# Patient Record
(demographics unavailable — no encounter records)

---

## 2024-10-07 NOTE — ADDENDUM
[FreeTextEntry1] : I, ANGELLA KONG, acted solely as a scribe for Dr. Luke Helton on this date 10/07/2024.  All medical record entries made by the Scribe were at my, Dr. Luke Helton, direction and personally dictated by me on 10/07/2024. I have reviewed the chart and agree that the record accurately reflects my personal performance of the history, physical exam, assessment and plan. I have also personally directed, reviewed, and agreed with the chart.

## 2024-10-07 NOTE — DISCUSSION/SUMMARY
[de-identified] : I went over the pathophysiology of the patient's symptoms in great detail with the patient. The patient elected to receive a Monovisc injection into her right knee today, and tolerated it well. I instructed the patient on ROM exercises, and told them to take it easy. The use of ice and rest was reviewed with the patient. The patient may resume activities tomorrow. I reminded the patient that it takes 4 to 6 weeks after the final injection to feel symptom relief.  Weight loss was discussed All of their questions were answered. They understand and consent to the plan.   FU in 6 weeks. She will get bilateral knee x rays upon return.

## 2024-10-07 NOTE — PHYSICAL EXAM
[Normal] : Gait: normal [Rad] : radial 2+ and symmetric bilaterally [de-identified] : Constitutional o Appearance : well-nourished, well developed, alert, in no acute distress  Head and Face o Head :  Inspection : atraumatic, normocephalic o Face :  Inspection : no visible rash or discoloration Respiratory o Respiratory Effort: breathing unlabored  Neurologic o Sensation : Normal sensation  Psychiatric o Mood and Affect: mood normal, affect appropriate  Lymphatic o Additional Nodes : No palpable lymph nodes present   o Elbow :  Inspection/Palpation : no tenderness, no swelling or deformities  Range of Motion : full and painless in all planes, no crepitance  Strength : flexion and extension 5/5  Stability : no joint instability on provocative testing  o Sensation : sensation intact to light touch o Tests: Tinels Sign negative, no pain with resisted pronation or supination, no pain with resisted extension of the middle finger, no pain with resisted extension of the wrist, no pain with gripping  Lumbosacral Spine o Inspection : no visible rash or discoloration o Palpation : paraspinal musculature diffusely tender, no sciatic notch tenderness  o Range of Motion : FROM, extension, sidebending and rotation cause no discomfort  o Muscle Strength : paraspinal muscle strength and tone within normal limits o Muscle Tone : paraspinal muscle strength and tone within normal limits o Tests: straight leg test negative bilaterally  Right Lower Extremity o Buttock : no tenderness, swelling or deformities  o Right Hip :    - Inspection/Palpation : mild greater trochanteric and ITB tenderness, no swelling or deformities    - Range of Motion : full flexion, and pain with IR rotation that is limited no crepitance    - Stability : joint stability intact    - Strength : extension, flexion 3/5, adduction, abduction, internal rotation and external rotation    - Tests: Felipe's test negative  o Right Knee :  Inspection/Palpation :  posteromedial joint line tenderness, no swelling  Range of Motion : 0-115, slightly decreased patellofemoral glide, mild patellofemoral crepitance  Stability : no valgus or varus instability present on provocative testing  Strength : flexion and extension 3/5  Tests and Signs : negative Anterior Drawer, negative Lachman, negative Evelyne  Left Lower Extremity o Buttock : no tenderness, swelling or deformities  o Left Hip :    - Inspection/Palpation : mild greater trochanteric and ITB tenderness, no swelling or deformities    - Range of Motion : full flex and limtied IR, no crepitance    - Stability : joint stability intact    - Strength : extension, flexion 4+/5, adduction, abduction, internal rotation and external rotation    - Tests: Felipe's test negative  o Left Knee :  Inspection/Palpation : medial compartment tenderness no lateral compartment tenderness , no swelling  Range of Motion : 0-115 , minimal patellofemoral crepitance, decreased patellofemoral glide   Stability : no valgus or varus instability present on provocative testing  Strength : flexion and extension 4+/5  Tests and Signs : negative Anterior Drawer, negative Lachman, negative Evelyne  Gait and Station: Gait: ambulating with a cane, slightly kyphotic, no significant extremity swelling or lymphedema, poor core strength, very tight hamstrings bilaterally, no foot drop, normal sensation to light touch   o Knee injection : Indication- right knee osteoarthritis, Anatomic location- right intra-articular joint space, Spray - area was sterilized with Betadine and alcohol and anesthetized with Ethyl Chloride , needle used-20G, Medications given- 4cc's Monovisc under Ultrasound guidance. The patient tolerated the procedure well.  [de-identified] : AP, transscapular and axillary x-rays of the left shoulder demonstrate no fracture, no dislocation and no bony abnormality.

## 2024-10-07 NOTE — REASON FOR VISIT
[Follow-Up Visit] : a follow-up visit for [Knee Pain] : knee pain [FreeTextEntry2] : left shoulder pain

## 2024-10-07 NOTE — HISTORY OF PRESENT ILLNESS
[de-identified] : 79 year old female presents for a right knee Monovisc injection today 10/7/2024. She had a left knee Monovisc injection on 10/1/2024 and denies problems. Patient presents ambulating with a cane. She notes she is doing well form the gel injections. She denies any swelling or buckling. Of note, She saw Dr. Casillas regarding her spinal stenosis and will consider surgery when she is miserable. She notes her vertigo has been under control.   Radiology Results done on 5/10/2024 o Lumbosacral Spine : AP and lateral views were obtained and revealed grade 1 spondylolisthesis of L4-5 and L5-S1 with diffuse facet arthropathy and foraminal stenosis at L4-5 and L5-S1 o Pelvis : AP pelvis was obtained and revealed moderate arthritis of both hips   Radiology Results 09/28/2023 o Right Knee : Standing AP, lateral and tunnel views of the knee were obtained and revealed severe medial patellofemoral arthritis  o Left Knee : Standing AP, lateral and tunnel views of the knee were obtained and revealed severe medial patellofemoral arthritis   Radiology Results from 03/16/2023: o Right Knee : Standing AP, lateral, tunnel, and skyline views of the knee were obtained and revealed severe medial compartment narrowing with moderate patellofemoral arthritis.  o Left Knee : Standing AP, lateral, tunnel, and skyline views of the knee were obtained and revealed moderate medial and patellofemoral arthritis.

## 2024-10-20 NOTE — PROCEDURE
[FreeTextEntry1] : Pulmonary Function Test performed in my office today: Spirometry: Within normal limits; Lung Volume: Within normal limits; Diffusion: Moderate impairment. -------------  Exhaled Nitric Oxide             Final  No Documents Attached    	Test  	Result  	Flag	Reference	Goal	Last Verified  	Exhaled Nitric Oxide	6	 	 		REQUIRED  Ordered by: ENRICO DENNEY       Collected/Examined: [id number] 11:06AM       Verification Required       Stage: Final       Performed at: In Office       Performed by: ENRICO DENNEY       Resulted: [id number] 11:06AM       Last Updated: [id number] 11:06AM

## 2024-10-20 NOTE — ASSESSMENT
[FreeTextEntry1] : Patient is currently on treatment with PAP. Data download confirms excellent compliance. Patient continues to use treatment and is benefiting from it.  Return for sleep follow-up in 3 months.

## 2024-10-20 NOTE — HISTORY OF PRESENT ILLNESS
[TextBox_4] : SHELIA f/u  Reports no current sleep symptoms. Using PAP on a nightly basis without difficulty. Reports no symptoms of daytime sleepiness. Getting supplies regularly.   Device: AirSense 11   Vendor: [employer]   Settin-15  Mask: nasal mask (airfit [id number])  Issues: none

## 2024-10-20 NOTE — REASON FOR VISIT
[Follow-Up] : a follow-up visit [Sleep Apnea] : sleep apnea [Hypersomnolence] : hypersomnolence [Cough] : cough

## 2024-10-20 NOTE — PROCEDURE
[FreeTextEntry1] : Pulmonary Function Test performed in my office today: Spirometry: Within normal limits; Lung Volume: Within normal limits; Diffusion: Moderate impairment. -------------  Exhaled Nitric Oxide             Final  No Documents Attached    	Test  	Result  	Flag	Reference	Goal	Last Verified  	Exhaled Nitric Oxide	6	 	 		REQUIRED  Ordered by: ENRICO DENNEY       Collected/Examined: [id number] 11:06AM       Verification Required       Stage: Final       Performed at: In Office       Performed by: ENRICO DENNEY       Resulted: [id number] 11:06AM       Last Updated: [id number] 11:06AM

## 2024-11-03 NOTE — HEALTH RISK ASSESSMENT
[0] : 2) Feeling down, depressed, or hopeless: Not at all (0) [PHQ-2 Negative - No further assessment needed] : PHQ-2 Negative - No further assessment needed [Former] : Former [[id number]] : 0

## 2024-11-03 NOTE — PHYSICAL EXAM
[No Acute Distress] : no acute distress [Well Nourished] : well nourished [Well Developed] : well developed [Well-Appearing] : well-appearing [Normal Sclera/Conjunctiva] : normal sclera/conjunctiva [PERRL] : pupils equal round and reactive to light [EOMI] : extraocular movements intact [Normal Outer Ear/Nose] : the outer ears and nose were normal in appearance [Normal Oropharynx] : the oropharynx was normal [No JVD] : no jugular venous distention [No Lymphadenopathy] : no lymphadenopathy [Supple] : supple [Thyroid Normal, No Nodules] : the thyroid was normal and there were no nodules present [No Respiratory Distress] : no respiratory distress  [No Accessory Muscle Use] : no accessory muscle use [Clear to Auscultation] : lungs were clear to auscultation bilaterally [Normal Rate] : normal rate  [Regular Rhythm] : with a regular rhythm [Normal S1, S2] : normal S1 and S2 [No Murmur] : no murmur heard [No Carotid Bruits] : no carotid bruits [No Varicosities] : no varicosities [Pedal Pulses Present] : the pedal pulses are present [No Edema] : there was no peripheral edema [No Extremity Clubbing/Cyanosis] : no extremity clubbing/cyanosis [Soft] : abdomen soft [Non Tender] : non-tender [Non-distended] : non-distended [Normal Bowel Sounds] : normal bowel sounds [Normal Posterior Cervical Nodes] : no posterior cervical lymphadenopathy [Normal Anterior Cervical Nodes] : no anterior cervical lymphadenopathy [No CVA Tenderness] : no CVA  tenderness [No Spinal Tenderness] : no spinal tenderness [No Joint Swelling] : no joint swelling [Grossly Normal Strength/Tone] : grossly normal strength/tone [No Rash] : no rash [Coordination Grossly Intact] : coordination grossly intact [No Focal Deficits] : no focal deficits [Normal Gait] : normal gait [Normal Affect] : the affect was normal [Alert and Oriented x3] : oriented to person, place, and time [Normal Insight/Judgement] : insight and judgment were intact [de-identified] : lateral to R breast near rib area: with small circular area of erythema unclear bug bite no cellulitis, no hot, warm or tender

## 2024-11-03 NOTE — HEALTH RISK ASSESSMENT
[0] : 2) Feeling down, depressed, or hopeless: Not at all (0) [PHQ-2 Negative - No further assessment needed] : PHQ-2 Negative - No further assessment needed [Former] : Former [[id number]] : 0

## 2024-11-03 NOTE — ADDENDUM
[FreeTextEntry1] : This note was written by Andree Gray on 10/29/2024 acting as medical scribe for Dr. Guy Bean. I, Dr. Guy Bean, have read and attest that all the information, medical decision making and discharge instructions within are true and accurate.

## 2024-11-03 NOTE — HISTORY OF PRESENT ILLNESS
[FreeTextEntry1] : 5-month f/u  rash/ lower back pain  [de-identified] : KEV BOYER is an 80-year old female with PMH kidney stones s/p hx of stent now removed, spinal stenosis, thyroid cancer s/p total thyroidectomy s/p radioactive iodine, presented to the office today for 5-month f/u. Pt reports this weekend she has lower back pain after moving heavy furniture says she takes Advil which helps a little but still is bad.  She has concert tickets for tomorrow night at [employer] and is requesting for a written note that she cannot go given pain and need to be mobile. Denies LE weakness, urinary/bowel incontinence, saddle anesthesia. She says she has a rash on her R breast, noticed it for a couple days now and says it can be itchy sometimes. Denies any tenderness or pain around the nipple.  Denies chest pain, SOB, AKERS, dizziness, diaphoresis, palpitations, LE swelling, orthopnea, syncope, n/v, headache.

## 2024-11-03 NOTE — HEALTH RISK ASSESSMENT
[0] : 2) Feeling down, depressed, or hopeless: Not at all (0) [PHQ-2 Negative - No further assessment needed] : PHQ-2 Negative - No further assessment needed [Former] : Former [[id number]] : 0

## 2024-11-03 NOTE — HISTORY OF PRESENT ILLNESS
[FreeTextEntry1] : 5-month f/u  rash/ lower back pain  [de-identified] : KEV BOYER is an 80-year old female with PMH kidney stones s/p hx of stent now removed, spinal stenosis, thyroid cancer s/p total thyroidectomy s/p radioactive iodine, presented to the office today for 5-month f/u. Pt reports this weekend she has lower back pain after moving heavy furniture says she takes Advil which helps a little but still is bad.  She has concert tickets for tomorrow night at [employer] and is requesting for a written note that she cannot go given pain and need to be mobile. Denies LE weakness, urinary/bowel incontinence, saddle anesthesia. She says she has a rash on her R breast, noticed it for a couple days now and says it can be itchy sometimes. Denies any tenderness or pain around the nipple.  Denies chest pain, SOB, AKERS, dizziness, diaphoresis, palpitations, LE swelling, orthopnea, syncope, n/v, headache.

## 2024-11-03 NOTE — PHYSICAL EXAM
[No Acute Distress] : no acute distress [Well Nourished] : well nourished [Well Developed] : well developed [Well-Appearing] : well-appearing [Normal Sclera/Conjunctiva] : normal sclera/conjunctiva [PERRL] : pupils equal round and reactive to light [EOMI] : extraocular movements intact [Normal Outer Ear/Nose] : the outer ears and nose were normal in appearance [Normal Oropharynx] : the oropharynx was normal [No JVD] : no jugular venous distention [No Lymphadenopathy] : no lymphadenopathy [Supple] : supple [Thyroid Normal, No Nodules] : the thyroid was normal and there were no nodules present [No Respiratory Distress] : no respiratory distress  [No Accessory Muscle Use] : no accessory muscle use [Clear to Auscultation] : lungs were clear to auscultation bilaterally [Normal Rate] : normal rate  [Regular Rhythm] : with a regular rhythm [Normal S1, S2] : normal S1 and S2 [No Murmur] : no murmur heard [No Carotid Bruits] : no carotid bruits [No Varicosities] : no varicosities [Pedal Pulses Present] : the pedal pulses are present [No Edema] : there was no peripheral edema [No Extremity Clubbing/Cyanosis] : no extremity clubbing/cyanosis [Soft] : abdomen soft [Non Tender] : non-tender [Non-distended] : non-distended [Normal Bowel Sounds] : normal bowel sounds [Normal Posterior Cervical Nodes] : no posterior cervical lymphadenopathy [Normal Anterior Cervical Nodes] : no anterior cervical lymphadenopathy [No CVA Tenderness] : no CVA  tenderness [No Spinal Tenderness] : no spinal tenderness [No Joint Swelling] : no joint swelling [Grossly Normal Strength/Tone] : grossly normal strength/tone [No Rash] : no rash [Coordination Grossly Intact] : coordination grossly intact [No Focal Deficits] : no focal deficits [Normal Gait] : normal gait [Normal Affect] : the affect was normal [Alert and Oriented x3] : oriented to person, place, and time [Normal Insight/Judgement] : insight and judgment were intact [de-identified] : lateral to R breast near rib area: with small circular area of erythema unclear bug bite no cellulitis, no hot, warm or tender

## 2024-11-03 NOTE — HISTORY OF PRESENT ILLNESS
[FreeTextEntry1] : 5-month f/u  rash/ lower back pain  [de-identified] : KEV BOYER is an 80-year old female with PMH kidney stones s/p hx of stent now removed, spinal stenosis, thyroid cancer s/p total thyroidectomy s/p radioactive iodine, presented to the office today for 5-month f/u. Pt reports this weekend she has lower back pain after moving heavy furniture says she takes Advil which helps a little but still is bad.  She has concert tickets for tomorrow night at [employer] and is requesting for a written note that she cannot go given pain and need to be mobile. Denies LE weakness, urinary/bowel incontinence, saddle anesthesia. She says she has a rash on her R breast, noticed it for a couple days now and says it can be itchy sometimes. Denies any tenderness or pain around the nipple.  Denies chest pain, SOB, AKERS, dizziness, diaphoresis, palpitations, LE swelling, orthopnea, syncope, n/v, headache.

## 2024-11-03 NOTE — PHYSICAL EXAM
[No Acute Distress] : no acute distress [Well Nourished] : well nourished [Well Developed] : well developed [Well-Appearing] : well-appearing [Normal Sclera/Conjunctiva] : normal sclera/conjunctiva [PERRL] : pupils equal round and reactive to light [EOMI] : extraocular movements intact [Normal Outer Ear/Nose] : the outer ears and nose were normal in appearance [Normal Oropharynx] : the oropharynx was normal [No JVD] : no jugular venous distention [No Lymphadenopathy] : no lymphadenopathy [Supple] : supple [Thyroid Normal, No Nodules] : the thyroid was normal and there were no nodules present [No Respiratory Distress] : no respiratory distress  [No Accessory Muscle Use] : no accessory muscle use [Clear to Auscultation] : lungs were clear to auscultation bilaterally [Normal Rate] : normal rate  [Regular Rhythm] : with a regular rhythm [Normal S1, S2] : normal S1 and S2 [No Murmur] : no murmur heard [No Carotid Bruits] : no carotid bruits [No Varicosities] : no varicosities [Pedal Pulses Present] : the pedal pulses are present [No Edema] : there was no peripheral edema [No Extremity Clubbing/Cyanosis] : no extremity clubbing/cyanosis [Soft] : abdomen soft [Non Tender] : non-tender [Non-distended] : non-distended [Normal Bowel Sounds] : normal bowel sounds [Normal Posterior Cervical Nodes] : no posterior cervical lymphadenopathy [Normal Anterior Cervical Nodes] : no anterior cervical lymphadenopathy [No CVA Tenderness] : no CVA  tenderness [No Spinal Tenderness] : no spinal tenderness [No Joint Swelling] : no joint swelling [Grossly Normal Strength/Tone] : grossly normal strength/tone [No Rash] : no rash [Coordination Grossly Intact] : coordination grossly intact [No Focal Deficits] : no focal deficits [Normal Gait] : normal gait [Normal Affect] : the affect was normal [Alert and Oriented x3] : oriented to person, place, and time [Normal Insight/Judgement] : insight and judgment were intact [de-identified] : lateral to R breast near rib area: with small circular area of erythema unclear bug bite no cellulitis, no hot, warm or tender

## 2024-11-03 NOTE — ADDENDUM
[FreeTextEntry1] : This note was written by Andere Gray on 10/29/2024 acting as medical scribe for Dr. Guy Bean. I, Dr. Guy Bean, have read and attest that all the information, medical decision making and discharge instructions within are true and accurate.

## 2024-11-15 NOTE — ASSESSMENT
[FreeTextEntry1] : Dr. Deshpande reviewed with KEV her CPAP compliance in office today. KEV is both compliant and benefiting from CPAP usage.   Kev should follow up with Dr. Deshpande in 1 month.

## 2024-11-15 NOTE — ADDENDUM
[FreeTextEntry1] : I, Fernando Osuna, acted solely as a scribe for Dr. Cira Deshpande D.O. on this date 11/15/2024.   All medical record entries made by the Scribe were at my, Dr. Cira Deshpande D.O., direction and personally dictated by me on 11/15/2024. I have reviewed the chart and agree that the record accurately reflects my personal performance of the history, physical exam, assessment and plan. I have also personally directed, reviewed, and agreed with the chart.

## 2024-11-18 NOTE — REVIEW OF SYSTEMS
Date & Time: 7/22/2024, 4:56 PM  Patient: Sj Simmons Sr.  Encounter Provider(s):    Chapito Reeder, Anna Mendez MD Thorp, MD Robin Ramirez Megan K, APN       To Whom It May Concern:    Sj Simmons was seen and treated in our department on 7/21/2024. He should not return to work until he is released from the hospital and clear to return to work .    If you have any questions or concerns, please do not hesitate to call.        _____________________________  Physician/APC Signature            [Joint Pain] : joint pain [Negative] : Heme/Lymph

## 2024-11-19 NOTE — DISCUSSION/SUMMARY
[de-identified] : I went over the pathophysiology of the patient's symptoms in great detail with the patient. The patient elected to receive a Monovisc injection into her right knee today, and tolerated it well. I instructed the patient on ROM exercises, and told them to take it easy. The use of ice and rest was reviewed with the patient. The patient may resume activities tomorrow. I reminded the patient that it takes 4 to 6 weeks after the final injection to feel symptom relief.  Weight loss was discussed All of their questions were answered. They understand and consent to the plan.   FU in 6 weeks. She will get bilateral knee x rays upon return.

## 2024-11-19 NOTE — HISTORY OF PRESENT ILLNESS
[de-identified] : 79 year old female presents for a right knee Monovisc injection today 10/7/2024. She had a left knee Monovisc injection on 10/1/2024 and denies problems. Patient presents ambulating with a cane. She notes she is doing well form the gel injections. She denies any swelling or buckling. Of note, She saw Dr. Casillas regarding her spinal stenosis and will consider surgery when she is miserable. She notes her vertigo has been under control.   Radiology Results done on 5/10/2024 o Lumbosacral Spine : AP and lateral views were obtained and revealed grade 1 spondylolisthesis of L4-5 and L5-S1 with diffuse facet arthropathy and foraminal stenosis at L4-5 and L5-S1 o Pelvis : AP pelvis was obtained and revealed moderate arthritis of both hips   Radiology Results 09/28/2023 o Right Knee : Standing AP, lateral and tunnel views of the knee were obtained and revealed severe medial patellofemoral arthritis  o Left Knee : Standing AP, lateral and tunnel views of the knee were obtained and revealed severe medial patellofemoral arthritis   Radiology Results from 03/16/2023: o Right Knee : Standing AP, lateral, tunnel, and skyline views of the knee were obtained and revealed severe medial compartment narrowing with moderate patellofemoral arthritis.  o Left Knee : Standing AP, lateral, tunnel, and skyline views of the knee were obtained and revealed moderate medial and patellofemoral arthritis.

## 2024-11-19 NOTE — DISCUSSION/SUMMARY
[de-identified] : I went over the pathophysiology of the patient's symptoms in great detail with the patient. The patient elected to receive a Monovisc injection into her right knee today, and tolerated it well. I instructed the patient on ROM exercises, and told them to take it easy. The use of ice and rest was reviewed with the patient. The patient may resume activities tomorrow. I reminded the patient that it takes 4 to 6 weeks after the final injection to feel symptom relief.  Weight loss was discussed All of their questions were answered. They understand and consent to the plan.   FU in 6 weeks. She will get bilateral knee x rays upon return.

## 2024-11-19 NOTE — PHYSICAL EXAM
[Normal] : Gait: normal [Rad] : radial 2+ and symmetric bilaterally [de-identified] : Constitutional o Appearance : well-nourished, well developed, alert, in no acute distress  Head and Face o Head :  Inspection : atraumatic, normocephalic o Face :  Inspection : no visible rash or discoloration Respiratory o Respiratory Effort: breathing unlabored  Neurologic o Sensation : Normal sensation  Psychiatric o Mood and Affect: mood normal, affect appropriate  Lymphatic o Additional Nodes : No palpable lymph nodes present   o Elbow :  Inspection/Palpation : no tenderness, no swelling or deformities  Range of Motion : full and painless in all planes, no crepitance  Strength : flexion and extension 5/5  Stability : no joint instability on provocative testing  o Sensation : sensation intact to light touch o Tests: Tinels Sign negative, no pain with resisted pronation or supination, no pain with resisted extension of the middle finger, no pain with resisted extension of the wrist, no pain with gripping  Lumbosacral Spine o Inspection : no visible rash or discoloration o Palpation : paraspinal musculature diffusely tender, no sciatic notch tenderness  o Range of Motion : FROM, extension, sidebending and rotation cause no discomfort  o Muscle Strength : paraspinal muscle strength and tone within normal limits o Muscle Tone : paraspinal muscle strength and tone within normal limits o Tests: straight leg test negative bilaterally  Right Lower Extremity o Buttock : no tenderness, swelling or deformities  o Right Hip :    - Inspection/Palpation : mild greater trochanteric and ITB tenderness, no swelling or deformities    - Range of Motion : full flexion, and pain with IR rotation that is limited no crepitance    - Stability : joint stability intact    - Strength : extension, flexion 3/5, adduction, abduction, internal rotation and external rotation    - Tests: Felipe's test negative  o Right Knee :  Inspection/Palpation :  posteromedial joint line tenderness, no swelling  Range of Motion : 0-115, slightly decreased patellofemoral glide, mild patellofemoral crepitance  Stability : no valgus or varus instability present on provocative testing  Strength : flexion and extension 3/5  Tests and Signs : negative Anterior Drawer, negative Lachman, negative Evelyne  Left Lower Extremity o Buttock : no tenderness, swelling or deformities  o Left Hip :    - Inspection/Palpation : mild greater trochanteric and ITB tenderness, no swelling or deformities    - Range of Motion : full flex and limtied IR, no crepitance    - Stability : joint stability intact    - Strength : extension, flexion 4+/5, adduction, abduction, internal rotation and external rotation    - Tests: Felipe's test negative  o Left Knee :  Inspection/Palpation : medial compartment tenderness no lateral compartment tenderness , no swelling  Range of Motion : 0-115 , minimal patellofemoral crepitance, decreased patellofemoral glide   Stability : no valgus or varus instability present on provocative testing  Strength : flexion and extension 4+/5  Tests and Signs : negative Anterior Drawer, negative Lachman, negative Evelyne  Gait and Station: Gait: ambulating with a cane, slightly kyphotic, no significant extremity swelling or lymphedema, poor core strength, very tight hamstrings bilaterally, no foot drop, normal sensation to light touch   o Knee injection : Indication- right knee osteoarthritis, Anatomic location- right intra-articular joint space, Spray - area was sterilized with Betadine and alcohol and anesthetized with Ethyl Chloride , needle used-20G, Medications given- 4cc's Monovisc under Ultrasound guidance. The patient tolerated the procedure well.  [de-identified] : AP, transscapular and axillary x-rays of the left shoulder demonstrate no fracture, no dislocation and no bony abnormality.

## 2024-11-19 NOTE — HISTORY OF PRESENT ILLNESS
[de-identified] : 79 year old female presents for a right knee Monovisc injection today 10/7/2024. She had a left knee Monovisc injection on 10/1/2024 and denies problems. Patient presents ambulating with a cane. She notes she is doing well form the gel injections. She denies any swelling or buckling. Of note, She saw Dr. Casillas regarding her spinal stenosis and will consider surgery when she is miserable. She notes her vertigo has been under control.   Radiology Results done on 5/10/2024 o Lumbosacral Spine : AP and lateral views were obtained and revealed grade 1 spondylolisthesis of L4-5 and L5-S1 with diffuse facet arthropathy and foraminal stenosis at L4-5 and L5-S1 o Pelvis : AP pelvis was obtained and revealed moderate arthritis of both hips   Radiology Results 09/28/2023 o Right Knee : Standing AP, lateral and tunnel views of the knee were obtained and revealed severe medial patellofemoral arthritis  o Left Knee : Standing AP, lateral and tunnel views of the knee were obtained and revealed severe medial patellofemoral arthritis   Radiology Results from 03/16/2023: o Right Knee : Standing AP, lateral, tunnel, and skyline views of the knee were obtained and revealed severe medial compartment narrowing with moderate patellofemoral arthritis.  o Left Knee : Standing AP, lateral, tunnel, and skyline views of the knee were obtained and revealed moderate medial and patellofemoral arthritis.

## 2024-11-19 NOTE — HISTORY OF PRESENT ILLNESS
[de-identified] : 79 year old female presents for a right knee Monovisc injection today 10/7/2024. She had a left knee Monovisc injection on 10/1/2024 and denies problems. Patient presents ambulating with a cane. She notes she is doing well form the gel injections. She denies any swelling or buckling. Of note, She saw Dr. Casillas regarding her spinal stenosis and will consider surgery when she is miserable. She notes her vertigo has been under control.   Radiology Results done on 5/10/2024 o Lumbosacral Spine : AP and lateral views were obtained and revealed grade 1 spondylolisthesis of L4-5 and L5-S1 with diffuse facet arthropathy and foraminal stenosis at L4-5 and L5-S1 o Pelvis : AP pelvis was obtained and revealed moderate arthritis of both hips   Radiology Results 09/28/2023 o Right Knee : Standing AP, lateral and tunnel views of the knee were obtained and revealed severe medial patellofemoral arthritis  o Left Knee : Standing AP, lateral and tunnel views of the knee were obtained and revealed severe medial patellofemoral arthritis   Radiology Results from 03/16/2023: o Right Knee : Standing AP, lateral, tunnel, and skyline views of the knee were obtained and revealed severe medial compartment narrowing with moderate patellofemoral arthritis.  o Left Knee : Standing AP, lateral, tunnel, and skyline views of the knee were obtained and revealed moderate medial and patellofemoral arthritis.

## 2024-11-19 NOTE — PHYSICAL EXAM
[Normal] : Gait: normal [Rad] : radial 2+ and symmetric bilaterally [de-identified] : Constitutional o Appearance : well-nourished, well developed, alert, in no acute distress  Head and Face o Head :  Inspection : atraumatic, normocephalic o Face :  Inspection : no visible rash or discoloration Respiratory o Respiratory Effort: breathing unlabored  Neurologic o Sensation : Normal sensation  Psychiatric o Mood and Affect: mood normal, affect appropriate  Lymphatic o Additional Nodes : No palpable lymph nodes present   o Elbow :  Inspection/Palpation : no tenderness, no swelling or deformities  Range of Motion : full and painless in all planes, no crepitance  Strength : flexion and extension 5/5  Stability : no joint instability on provocative testing  o Sensation : sensation intact to light touch o Tests: Tinels Sign negative, no pain with resisted pronation or supination, no pain with resisted extension of the middle finger, no pain with resisted extension of the wrist, no pain with gripping  Lumbosacral Spine o Inspection : no visible rash or discoloration o Palpation : paraspinal musculature diffusely tender, no sciatic notch tenderness  o Range of Motion : FROM, extension, sidebending and rotation cause no discomfort  o Muscle Strength : paraspinal muscle strength and tone within normal limits o Muscle Tone : paraspinal muscle strength and tone within normal limits o Tests: straight leg test negative bilaterally  Right Lower Extremity o Buttock : no tenderness, swelling or deformities  o Right Hip :    - Inspection/Palpation : mild greater trochanteric and ITB tenderness, no swelling or deformities    - Range of Motion : full flexion, and pain with IR rotation that is limited no crepitance    - Stability : joint stability intact    - Strength : extension, flexion 3/5, adduction, abduction, internal rotation and external rotation    - Tests: Felipe's test negative  o Right Knee :  Inspection/Palpation :  posteromedial joint line tenderness, no swelling  Range of Motion : 0-115, slightly decreased patellofemoral glide, mild patellofemoral crepitance  Stability : no valgus or varus instability present on provocative testing  Strength : flexion and extension 3/5  Tests and Signs : negative Anterior Drawer, negative Lachman, negative Evelyne  Left Lower Extremity o Buttock : no tenderness, swelling or deformities  o Left Hip :    - Inspection/Palpation : mild greater trochanteric and ITB tenderness, no swelling or deformities    - Range of Motion : full flex and limtied IR, no crepitance    - Stability : joint stability intact    - Strength : extension, flexion 4+/5, adduction, abduction, internal rotation and external rotation    - Tests: Felipe's test negative  o Left Knee :  Inspection/Palpation : medial compartment tenderness no lateral compartment tenderness , no swelling  Range of Motion : 0-115 , minimal patellofemoral crepitance, decreased patellofemoral glide   Stability : no valgus or varus instability present on provocative testing  Strength : flexion and extension 4+/5  Tests and Signs : negative Anterior Drawer, negative Lachman, negative Evelyne  Gait and Station: Gait: ambulating with a cane, slightly kyphotic, no significant extremity swelling or lymphedema, poor core strength, very tight hamstrings bilaterally, no foot drop, normal sensation to light touch   o Knee injection : Indication- right knee osteoarthritis, Anatomic location- right intra-articular joint space, Spray - area was sterilized with Betadine and alcohol and anesthetized with Ethyl Chloride , needle used-20G, Medications given- 4cc's Monovisc under Ultrasound guidance. The patient tolerated the procedure well.  [de-identified] : AP, transscapular and axillary x-rays of the left shoulder demonstrate no fracture, no dislocation and no bony abnormality.

## 2024-11-19 NOTE — DISCUSSION/SUMMARY
[de-identified] : I went over the pathophysiology of the patient's symptoms in great detail with the patient. The patient elected to receive a Monovisc injection into her right knee today, and tolerated it well. I instructed the patient on ROM exercises, and told them to take it easy. The use of ice and rest was reviewed with the patient. The patient may resume activities tomorrow. I reminded the patient that it takes 4 to 6 weeks after the final injection to feel symptom relief.  Weight loss was discussed All of their questions were answered. They understand and consent to the plan.   FU in 6 weeks. She will get bilateral knee x rays upon return.

## 2024-11-19 NOTE — PHYSICAL EXAM
[Normal] : Gait: normal [Rad] : radial 2+ and symmetric bilaterally [de-identified] : Constitutional o Appearance : well-nourished, well developed, alert, in no acute distress  Head and Face o Head :  Inspection : atraumatic, normocephalic o Face :  Inspection : no visible rash or discoloration Respiratory o Respiratory Effort: breathing unlabored  Neurologic o Sensation : Normal sensation  Psychiatric o Mood and Affect: mood normal, affect appropriate  Lymphatic o Additional Nodes : No palpable lymph nodes present   o Elbow :  Inspection/Palpation : no tenderness, no swelling or deformities  Range of Motion : full and painless in all planes, no crepitance  Strength : flexion and extension 5/5  Stability : no joint instability on provocative testing  o Sensation : sensation intact to light touch o Tests: Tinels Sign negative, no pain with resisted pronation or supination, no pain with resisted extension of the middle finger, no pain with resisted extension of the wrist, no pain with gripping  Lumbosacral Spine o Inspection : no visible rash or discoloration o Palpation : paraspinal musculature diffusely tender, no sciatic notch tenderness  o Range of Motion : FROM, extension, sidebending and rotation cause no discomfort  o Muscle Strength : paraspinal muscle strength and tone within normal limits o Muscle Tone : paraspinal muscle strength and tone within normal limits o Tests: straight leg test negative bilaterally  Right Lower Extremity o Buttock : no tenderness, swelling or deformities  o Right Hip :    - Inspection/Palpation : mild greater trochanteric and ITB tenderness, no swelling or deformities    - Range of Motion : full flexion, and pain with IR rotation that is limited no crepitance    - Stability : joint stability intact    - Strength : extension, flexion 3/5, adduction, abduction, internal rotation and external rotation    - Tests: Felipe's test negative  o Right Knee :  Inspection/Palpation :  posteromedial joint line tenderness, no swelling  Range of Motion : 0-115, slightly decreased patellofemoral glide, mild patellofemoral crepitance  Stability : no valgus or varus instability present on provocative testing  Strength : flexion and extension 3/5  Tests and Signs : negative Anterior Drawer, negative Lachman, negative Evelyne  Left Lower Extremity o Buttock : no tenderness, swelling or deformities  o Left Hip :    - Inspection/Palpation : mild greater trochanteric and ITB tenderness, no swelling or deformities    - Range of Motion : full flex and limtied IR, no crepitance    - Stability : joint stability intact    - Strength : extension, flexion 4+/5, adduction, abduction, internal rotation and external rotation    - Tests: Felipe's test negative  o Left Knee :  Inspection/Palpation : medial compartment tenderness no lateral compartment tenderness , no swelling  Range of Motion : 0-115 , minimal patellofemoral crepitance, decreased patellofemoral glide   Stability : no valgus or varus instability present on provocative testing  Strength : flexion and extension 4+/5  Tests and Signs : negative Anterior Drawer, negative Lachman, negative Evelyne  Gait and Station: Gait: ambulating with a cane, slightly kyphotic, no significant extremity swelling or lymphedema, poor core strength, very tight hamstrings bilaterally, no foot drop, normal sensation to light touch   o Knee injection : Indication- right knee osteoarthritis, Anatomic location- right intra-articular joint space, Spray - area was sterilized with Betadine and alcohol and anesthetized with Ethyl Chloride , needle used-20G, Medications given- 4cc's Monovisc under Ultrasound guidance. The patient tolerated the procedure well.  [de-identified] : AP, transscapular and axillary x-rays of the left shoulder demonstrate no fracture, no dislocation and no bony abnormality.

## 2024-12-19 NOTE — HISTORY OF PRESENT ILLNESS
[FreeTextEntry1] : 80 Y old female here for f/up of poorly differentiated thyroid carcinoma of left thyroid lobe s/p left hemithyroidectomy in 4/2022 followed by completion thyroidectomy 8/2022  thyroid papillary microcarcinoma  Timeline:  CT scan 12/2021: Enlargement of left thyroid lobe with associated substernal extension and rightward tracheal displacement/mild tracheal compression.   Left hemithyroidectomy done 4/2022:  -poorly differentiated thyroid carcinoma, 6.8cm in greatest dimension, widely invasive -extensive lymphovascular invasion present -no perineural invasion identified -the resection margins are negative for carcinoma  Had completion thyroidectomy 8/2022:   Pathology 8/8/2022: 1: Right thyroid lobe substernal goiter: papillary microcarcinoma (1mm); chromic lymphocytic thyroiditis.   9/2022:  anti-TG abs +ve 43.5  TG 0.69   10/3/2022:  Thyrogen stim NM uptake scan:   " iodine-avid tissue in the anterior neck which localize in the left thyroid bed in SPECT/ CT. Unremarkable tracer activity is seen in the nasopharynx, salivary glands, GI tract, urinary bladder. FDG avid focus in the tight thyroid bed on PET/ CT is not iodine avid.   10/5/2022: PET/CT FDG: Increase uptake at soft tissue nodule in the RIGHT paratracheal region, suspicious for malignant involvement.  Anterior neck midline activity corresponds to an incision and may represent healing...., small symmetric moderately avid nodes seen...... osseous involvement, with examples at the iliac bones, and right femoral neck.."   10/7/2022:  Thyrogen stim I-131 scan demonstrates iodine avid tissue in the anterior neck/ thyroid bed. 48hr neck uptake is 0.22%. Undetectable stimulated thyroglobulin in presence of thyroglobulin antibody.   10/12/2022:  NM thyroid ca radiopharm oral treatment:   uneventful administration of 190.8mci of I131 for thyroid remnant ablation after total thyroidectomy for thyroid cancer.    10/18/2022:   post therapy I 131 WBS:  iodine avid tissue in the anterior neck/ thyroid bed, just to the left of the midline, unchanged in distribution compared to diagnostic study on 10/7/0222.  No new lesions. No radiotracer activity to correspond to the FDG avid focus in the right paratracheal region, pelvis and right femoral neck seen on PET/ CT from 10/5/2022.    12/27/2022: Due to see head and neck surgery :reports that her appointment was postponed till may 2023; she was concerned about long wait, so scheduled f/up with head and neck at [facility] in coming this month 2/2023  TG: undetectable (LCMS) TG-ab <20  5/2023: thyroid sonogram: thyroidectomy bed unremarkable, 0.8 and 0.4 cm left cervical level 3 LNs, indeterminate   8/2023: thyroid sonogram : 2.9 x 0.5 cm left cervical level 3 lymph node, unchanged  Saw ENT 12/2023: Dr Harper, no reported intervention for LN, plans for just sonographic monitoring. Documentation requested from [facility].   Interval: followed with Dr Harper, reports stable sonograms, no intervention planned  12-9-2024: TG <0.10   No new complaints Currently on LT4 112mcg po daily   patient reports that she had f/up sonogram and evaluation with Dr Harper and no intervention planned, just monitoring.    T2DM:  A1c 6.9%  on  saxagliptin 5mg po daily   Ozempic: unable to get due to insurance/ cost

## 2024-12-19 NOTE — PHYSICAL EXAM
[facility]  PATIENT:  Brandy Ayala  MRN:  [id number]  :  1965  DATE OF SERVICE:  10/1/2024  REFERRED BY: No ref. provider found  PCP: Marti Lema DO    Assessment/Plan:     Migraine without aura and without status migrainosus, not intractable  She has been taking Topamax 50mg BID and propranolol 20mg BID. She has new glasses as well and has noticed a decrease in her headache frequency.  Last month she experienced about 2 migraines whereas before she was having 2-3 per week.  She has gone to part time work as well which she believes is helping- less screen time (avoiding working more than 2 days in a row).  She tried to cut back on her topamax, but noticed an increase in her headache frequency so she went back up to the current dosage.  We reviewed her brain MRI which came back without any structural abnormalities causing her symptoms.  She reports some forgetfulness along with occasional word-finding difficulties.  Denies doing any unsafe behaviors, recognizes familiar faces, and doesn't get lost going somewhere familiar.  Can consider additional work up if symptoms persist.   Workup:  Lab work for patient to complete at her convenience to rule out treatable causes for her symptoms  Preventative:  We discussed headache hygiene and lifestyle factors that may improve headaches  Continue Topamax 50mg BID (she may wish to wean from this in the near future. Advised her to contact me for weaning schedule)  Continue propranolol 20mg BID  Past/ failed/contraindicated: Cymbalta (prescribed for pain but didn't help with headaches), Gabapentin (made her swell up)  Future options:CGRP med, botox  Acute:  Discussed not taking over-the-counter or prescription pain medications more than 3 days per week to prevent medication overuse/rebound headache  Continue Zomig 2.5mg at the onset of a migraine.  May be repeated 2 hours later if not completely headache free. No more than 2 tabs in 24 hours  Start  [Alert] : alert robaxin 500mg QHS prn neck/head pain  Past/ failed/contraindicated: Imitrex (ineffective)  Future options:  ubrelvy, reyvow, abdirahman       Diagnoses and all orders for this visit:    Migraine without aura and without status migrainosus, not intractable  -     methocarbamol (ROBAXIN) 500 mg tablet; Take 1 tablet (500 mg total) by mouth daily as needed for muscle spasms  -     topiramate (TOPAMAX) 50 MG tablet; Take 1 tablet (50 mg total) by mouth 2 (two) times a day TAKE 1 TAB BY MOUTH NIGHTLY FOR 1 WEEK, THEN INCREASE TO 2 TABS NIGHTLY FOR 1 WEEK, THEN INCREASE TO 3 TABS NIGHTLY FOR 1 WEEK, THEN CONTINUE AT 4 TABS NIGHTLY.  -     propranolol (INDERAL) 20 mg tablet; Take 1 tablet (20 mg total) by mouth 2 (two) times a day  -     ZOLMitriptan (ZOMIG-ZMT) 2.5 MG disintegrating tablet; Take 1 tablet (2.5 mg total) by mouth daily as needed for migraine Take 1 tablet by mouth at the earliest onset of migraine. May repeat again in 2 hours if not completely headache free.  No more than 2 tablets in 24 hours.         Patient should follow up in 6 months, or I would be happy to see her sooner if needed.  Patient should call the office or send a "[employer]"t message with any questions or concerns.  Patient should present to the nearest emergency department with any concerning symptoms.     CC:     We had the pleasure of evaluating Brandy in neurological follow up today. she is a 59 y.o. year-old female who presents today for evaluation of headaches.     History obtained from patient as well as available medical record review.    History of Present Illness:     Patient last office visit was with myself on 3/26/24.  For review: Headaches started at what age? As a teenager, she experiences migraines related to her menstrual cycle.  After her hysterectomy in 2010, her headaches lessened by never went away.  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  She had an MRI of  [Well Nourished] : well nourished her neck for ongoing neck pain and had an appointment with the neurosurgeon but they did not offer surgery and recommended conservative management at that time. Face pressure and headache improved slightly after she had her lymphoma treated (nasopharynx 2022) but the pain never went fully away. She was experiencing approximately 4 migraines per week  OV with me 6/25/24:She is still waiting for her new glasses and she is hopeful that once she stabilizes on the new prescription, that her headaches may decrease in frequency. 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  She is still experiencing approximately 2-3 migraine headaches per week even after titrating up to 100mg of Topamax daily (she takes 50mg BID).  Zomig is effective at eliminating the bad headaches if she catches them early enough but she feels like she is using it quite frequently.    Other problems discussed this visit:   She reports ongoing neck pain and stiffness. Sleeping wrong can increase her neck pain and can trigger her migraines at times.  She has tried PT in the past which has been somewhat helpful. She had a consult with a neurosurgeon who recommended continued conservative management of her symptoms.   She wanted to report leg shaking and paresthesias bilaterally that started occurring 6 months ago or more.  She reported thinking that the dog was shivering in the bed but found out that it was her legs.  She denies any pain or new onset weakness in her bilateral lower extremities.      Interval history as of 10/1/24:  She has been taking Topamax 50mg BID and propranolol 20mg BID. She has new glasses as well and has noticed a decrease in her headache frequency.  Last month she experienced about 2 migraines whereas before she was having 2-3 per week.  She has gone to part time work as well which she believes is helping- less screen time (avoiding  [No Acute Distress] : no acute distress working more than 2 days in a row).  She tried to cut back on her topamax, but noticed an increase in her headache frequency so she went back up to the current dosage.      How often do the headaches occur? Last visit 2-3 days per week-every day she works on the screen. Now, 2 per month.   What time of the day do the headaches start?  Usually begins after she is working on her computer for 5 or 6 hours.   How long do the headaches last? Can last hours to days  Are you ever headache free? Yes  Aura? without aura  Where is your headache located and pain quality? Right side of sinus and behind her right eye and side and back of her head.   What is the intensity of pain? Worst 7/10, Average: 5/10  Associated symptoms:   [] Nausea       [] Vomiting        [] Diarrhea  [x] Stiff or sore neck   [x] Problems with concentration  [x] Photophobia     []Phonophobia      [] Osmophobia  [] Blurred vision   [x] Prefer quiet, dark room  [] Light-headed or dizzy     [x] Tinnitus   [] Hands or feet tingle or feel numb/paresthesias    [] Ptosis      [] Facial droop  [] Lacrimation  [] Nasal congestion/rhinorrhea      Things that make the headache worse? Working on her computer    Headache triggers:  blue lights, screens, alcohol, salty foods    Have you seen someone else for headaches or pain? No  Have you had trigger point injection performed and how often? Yes  Have you had Botox injection performed and how often? No   Have you had epidural injections or transforaminal injections performed? No  Are you current pregnant or planning on getting pregnant? No. Patient had hysterectomy.    Have you ever had any Brain imaging? 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 2) Subcapsular cataract 3) Cystoid macular edema of both eyes     What medications do you take or have you taken for your  [Well Developed] : well developed headaches?:    ABORTIVE:    OTC medications: ibuprofen 800mg  Prescription: Zomig 2.5mg (effective if she catches early)  Medications from other providers: robaxin  Past/failed/contraindicated: Imitrex (stopped working)    PREVENTIVE:   OTC medications: none  Prescription: Topamax 50mg BID, propranolol 20mg BID  Medications from other providers: verapamil  Past/failed/contraindicated: cymbalta    Lifestyle history/modifications:    Sleep   Averages: solid 5 hours of sleep then tosses and turns secondary to hip, neck pain  Water: Drinks maybe 4oz during the work day.   Caffeine: 1 cup per day   Mood: Some anxiety and depression related to her medical condition    Pertinent family history:  Family history of headaches:  no known family members with significant headaches  Any family history of aneurysms - No    Pertinent social history:  Work: Works as a nurse  Education: college  Lives with   Illicit Drugs: denies  Alcohol/tobacco: Denies alcohol use    Past Medical History:     Past Medical History:   Diagnosis Date    Cancer ([facility])     non-hogikins lymphoma     Colon polyp     Convergence insufficiency     bilateral eyes.    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 Now multiple times a week cor a few years    Movement disorder 2023    Mild leg tremors while in bed    Non Hodgkin's lymphoma (HCC) 2020    stage 1, had surgery and XRT to the neck( finished july 2020), rituximab in falll 2022 for recurrence in sinus    Overweight     Peripheral neuropathy 2015    Arms and feet    Retinitis pigmentosa     Shingles 12/23    Vision loss 1969    Head trauma from a fall       Past Surgical History:    Procedure Laterality Date    ANAL SPHINCTEROPLASTY      ANAL/SPHINCTEROPLASTY      ANTERIOR AND POSTERIOR VAGINAL REPAIR      BLADDER SUSPENSION      CATARACT EXTRACTION      COLONOSCOPY      DILATION AND CURETTAGE OF UTERUS      X 2    HYSTERECTOMY  2014    OOPHORECTOMY Bilateral 2014    with hysterectomy    TONSILLECTOMY AND ADENOIDECTOMY      UPPER GASTROINTESTINAL ENDOSCOPY      US GUIDED LYMPH NODE BIOPSY LEFT  04/21/2020       Patient Active Problem List   Diagnosis    IBS (irritable bowel syndrome)    Iron deficiency    Migraine without aura and without status migrainosus, not intractable    Supraventricular tachycardia (HCC)    Retinitis pigmentosa    Hypothyroidism due to Hashimoto's thyroiditis    Enhanced S-cone syndrome    History of colonic polyps    Peripheral visual field defect of both eyes    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)    Gastro-esophageal reflux disease with esophagitis    S/P MISHA (total abdominal hysterectomy)    Vitamin D deficiency    History of radiation to head and neck region    Postmenopausal    Elevated parathyroid hormone    Acute right-sided low back pain with right-sided sciatica    Class 1 obesity with body mass index (BMI) of 31.0 to 31.9 in adult    Follicular lymphoma of extranodal site excluding spleen and other solid organs (HCC)    Radiculopathy, cervical    Nasopharyngeal mass    S/P dissection of cervical lymph nodes    Subcapsular cataract    Neck pain    Shingles rash       Medications:     Current Outpatient Medications   Medication Sig Dispense Refill    calcium carbonate (Tums Ultra 1000) 1000 MG chewable tablet Takes 1 as needed      dorzolamide (TRUSOPT) 2 % ophthalmic solution Administer 1 drop to the right eye 2 (two) times a day      estradiol (ESTRACE) 0.5 MG tablet Take 1 mg by mouth daily      ibuprofen (MOTRIN) 200 mg tablet Take 600 mg by mouth every 6 (six) hours as needed for mild pain      levothyroxine 125 mcg tablet TAKE 1  [Normal Sclera/Conjunctiva] : normal sclera/conjunctiva TABLET 6 DAYS A WEEK AND 1/2 TABLET ON SUNDAY (Patient taking differently: Take 1 tablet 6 days a week) 90 tablet 3    methocarbamol (ROBAXIN) 500 mg tablet Take 1 tablet (500 mg total) by mouth daily as needed for muscle spasms 30 tablet 5    Multiple Vitamins-Minerals (MULTIVITAMIN GUMMIES WOMENS) CHEW Chew 2 each daily      NON FORMULARY Cspsules--take 1 at bedtime( MMG)      omeprazole (PriLOSEC) 40 MG capsule TAKE 1 CAPSULE (40 MG TOTAL) BY MOUTH DAILY. 90 capsule 1    propranolol (INDERAL) 20 mg tablet Take 1 tablet (20 mg total) by mouth 2 (two) times a day 60 tablet 5    topiramate (TOPAMAX) 50 MG tablet Take 1 tablet (50 mg total) by mouth 2 (two) times a day TAKE 1 TAB BY MOUTH NIGHTLY FOR 1 WEEK, THEN INCREASE TO 2 TABS NIGHTLY FOR 1 WEEK, THEN INCREASE TO 3 TABS NIGHTLY FOR 1 WEEK, THEN CONTINUE AT 4 TABS NIGHTLY. 60 tablet 5    verapamil (CALAN-SR) 120 mg CR tablet Take 120 mg by mouth in the morning      VITAMIN D, ERGOCALCIFEROL, PO Take 5,000 Units by mouth daily      ZOLMitriptan (ZOMIG-ZMT) 2.5 MG disintegrating tablet Take 1 tablet (2.5 mg total) by mouth daily as needed for migraine Take 1 tablet by mouth at the earliest onset of migraine. May repeat again in 2 hours if not completely headache free.  No more than 2 tablets in 24 hours. 18 tablet 5     No current facility-administered medications for this visit.        Allergies:     Allergies   Allergen Reactions    Latex Rash and Hives    Iodinated Contrast Media Sneezing     Sneezing, tongue tingling  Sneezing, tongue tingling      Gabapentin Edema    Lyrica [Pregabalin] Edema    Other      Other reaction(s): hair dye causes rash  Orange coating    Rituximab Itching and Other (See Comments)     Felt hot all over and restless    Lansoprazole Rash       Family History:     Family History   Problem Relation Age of Onset    Cancer Mother         lung    Lung cancer Mother 59    COPD Father     Substance Abuse Sister     HIV Sister     Drug abuse  [EOMI] : extra ocular movement intact Sister     No Known Problems Daughter     No Known Problems Maternal Grandmother     Throat cancer Maternal Grandfather 73    Other Maternal Grandfather         Aortic valve replacement     No Known Problems Paternal Grandmother     No Known Problems Paternal Grandfather     Diabetes type II Brother         on insulin    No Known Problems Paternal Aunt     Mental illness Neg Hx     Colon cancer Neg Hx     Colon polyps Neg Hx        Social History:     Social History     Socioeconomic History    Marital status: /Civil Union     Spouse name: Not on file    Number of children: Not on file    Years of education: Not on file    Highest education level: Not on file   Occupational History    Occupation: RN   Tobacco Use    Smoking status: Never    Smokeless tobacco: Never   Vaping Use    Vaping status: Never Used   Substance and Sexual Activity    Alcohol use: Yes     Comment: At occasions    Drug use: Yes     Frequency: 7.0 times per week     Types: Marijuana     Comment: Medical license obtained 10/2020    Sexual activity: Yes     Partners: Male     Birth control/protection: Male Sterilization, Female Sterilization   Other Topics Concern    Not on file   Social History Narrative    Dental care, regularly    Lives with spouse    Living situation: Supportive and safe    No alcohol use - As per [employer]    Social drinker - As per [employer]      Social Determinants of Health     Financial Resource Strain: Not on file   Food Insecurity: Not on file   Transportation Needs: No Transportation Needs (9/19/2019)    PRAPARE - Transportation     Lack of Transportation (Medical): No     Lack of Transportation (Non-Medical): No   Physical Activity: Not on file   Stress: Not on file   Social Connections: Not on file   Intimate Partner Violence: Not on file   Housing Stability: Not on file       Objective:     Physical Exam:    Vitals:  /68 (BP Location: Left arm, Patient Position: Sitting, Cuff Size: Standard)   Pulse  [No Proptosis] : no proptosis "59   Temp 98.7 °F (37.1 °C) (Temporal)   Ht 5' 5\" (1.651 m)   Wt 85.6 kg (188 lb 12.8 oz)   LMP  (LMP Unknown)   BMI 31.42 kg/m²   BP Readings from Last 3 Encounters:   10/01/24 100/68   24 130/64   24 122/70     Pulse Readings from Last 3 Encounters:   10/01/24 59   24 80   24 71       On neurological examination the patient was awake, alert, attentive, oriented to person, place, and time. Recent and remote memory intact to conversation with no evidence of language dysfunction. Satisfactory fund of knowledge. Normal attention span and concentration.  Mood, affect and judgement are appropriate. Speech is fluent without dysarthria or aphasia. Face appears symmetric, with no obvious weakness noted.  Audition is intact to casual conversation.  Eye movements are intact.  Able to move bilateral upper extremities antigravity without difficulty.      Pertinent lab results:   Lab Results   Component Value Date     03/10/2017    SODIUM 141 2024    K 3.9 2024     2024    CO2 26 2024    AGAP 8 2024    BUN 17 2024    CREATININE 0.86 2024    GLUC 87 2024    GLUF 98 2023    CALCIUM 9.1 2024    AST 20 2024    ALT 19 2024    ALKPHOS 54 2024    TP 6.6 2024    TBILI 0.44 2024    EGFR 74 2024      Lab Results   Component Value Date    WBC 4.7 2020    HGB 13.6 2020    HCT 40.1 2020    MCV 85 2020     2020      Lab Results   Component Value Date    XSA5SHPWSAHR 1.052 2023    TSH 1.84 2021       Pertinent Imagin24 MRI Brain: No acute infarction, intracranial hemorrhage or mass effect.   23 MRI Cervical spine: Multilevel cervical spondylosis, as described above.   Multifactorial disease results in severe left foraminal narrowing at C5-C6 and C6-C7.Minimal flattening of the left ventral aspect of the cord is present at C5-C6.      Review of " [Normal Oropharynx] : the oropharynx was normal Systems:     Constitutional: Negative.    HENT: Negative.     Eyes: Negative.    Respiratory: Negative.     Cardiovascular: Negative.    Gastrointestinal: Negative.    Endocrine: Negative.    Genitourinary: Negative.    Musculoskeletal: Negative.    Skin: Negative.    Allergic/Immunologic: Negative.    Neurological:  Positive for headaches.   Hematological: Negative.    Psychiatric/Behavioral: Negative.       Reviewed ROS as entered by medical assistant.     I have spent a total time of 35 minutes on 10/01/24 in caring for this patient including Diagnostic results, Prognosis, Risks and benefits of tx options, Instructions for management, Patient and family education, Importance of tx compliance, Risk factor reductions, Impressions, Counseling / Coordination of care, Documenting in the medical record, Reviewing / ordering tests, medicine, procedures  , and Obtaining or reviewing history  .       Author:  MANDY Weber 10/1/2024 10:22 AM     [No LAD] : no lymphadenopathy [Supple] : the neck was supple [Well Healed Scar] : well healed scar [No Respiratory Distress] : no respiratory distress [No Accessory Muscle Use] : no accessory muscle use [Clear to Auscultation] : lungs were clear to auscultation bilaterally [Normal S1, S2] : normal S1 and S2 [Normal Rate] : heart rate was normal [Regular Rhythm] : with a regular rhythm [No Edema] : no peripheral edema [Pedal Pulses Normal] : the pedal pulses are present [Normal Bowel Sounds] : normal bowel sounds [Not Tender] : non-tender [Not Distended] : not distended [Soft] : abdomen soft [Normal Anterior Cervical Nodes] : no anterior cervical lymphadenopathy [No Spinal Tenderness] : no spinal tenderness [Spine Straight] : spine straight [No Stigmata of Cushings Syndrome] : no stigmata of Cushings Syndrome [Normal Gait] : normal gait [Normal Strength/Tone] : muscle strength and tone were normal [No Rash] : no rash [Normal Reflexes] : deep tendon reflexes were 2+ and symmetric [No Tremors] : no tremors [Oriented x3] : oriented to person, place, and time [Acanthosis Nigricans] : no acanthosis nigricans

## 2025-04-07 NOTE — HISTORY OF PRESENT ILLNESS
[de-identified] : 80 year old female presents for a left knee Monovisc injection today 4/7/2025. Patient states she has trouble walking long distances and is constantly looking for a place to sit. Patient presents ambulating with a cane in the right hand. She complains of instability and walks with a broad base gait to prevent falling. Patient denies that she has any numbness or tingling. Patient denies taking any medication to manage her pain. Of note, She saw Dr. Casillas regarding her spinal stenosis and will consider surgery when she is miserable. She notes her vertigo has been under control.  When doing the gel shots we do do them sitting up due to the vertigo.  Radiology Results 2/11/2025 o Lumbosacral Spine : AP and lateral views were obtained and revealed grade 1 spondylolisthesis of L4-5 and no evident compression fracture and foraminal stenosis at L4-5 and L5-S1  o Pelvis : AP pelvis was obtained and revealed moderate arthritis of both hips    Radiology Results 11/18/2024 o Right Knee : Standing AP, lateral and tunnel views of the knee were obtained and revealed severe medial and patellofemoral arthritis  o Left Knee : Standing AP, lateral and tunnel views of the knee were obtained and revealed moderate to severe medial and sig patellofemoral arthritis  Radiology Results done on 5/10/2024 o Lumbosacral Spine : AP and lateral views were obtained and revealed grade 1 spondylolisthesis of L4-5 and L5-S1 with diffuse facet arthropathy and foraminal stenosis at L4-5 and L5-S1 o Pelvis : AP pelvis was obtained and revealed moderate arthritis of both hips   Radiology Results 09/28/2023 o Right Knee : Standing AP, lateral and tunnel views of the knee were obtained and revealed severe medial patellofemoral arthritis  o Left Knee : Standing AP, lateral and tunnel views of the knee were obtained and revealed severe medial patellofemoral arthritis   Radiology Results from 03/16/2023: o Right Knee : Standing AP, lateral, tunnel, and skyline views of the knee were obtained and revealed severe medial compartment narrowing with moderate patellofemoral arthritis.  o Left Knee : Standing AP, lateral, tunnel, and skyline views of the knee were obtained and revealed moderate medial and patellofemoral arthritis.

## 2025-04-07 NOTE — DISCUSSION/SUMMARY
[de-identified] : I went over the pathophysiology of the patient's symptoms in great detail with the patient. The patient elected to receive a Monovisc injection into her left knee today, and tolerated it well. I instructed the patient on ROM exercises, and told them to take it easy. The use of ice and rest was reviewed with the patient. The patient may resume activities tomorrow. I reminded the patient that it takes 4 to 6 weeks after the final injection to feel symptom relief.   All of their questions were answered. They understand and consent to the plan.   FU for her right knee Monovisc.

## 2025-04-07 NOTE — ADDENDUM
[FreeTextEntry1] : I, ANGELLA KONG, acted solely as a scribe for Dr. Luke Helton on this date 04/07/2025.  All medical record entries made by the Scribe were at my, Dr. Luke Helton, direction and personally dictated by me on 04/07/2025. I have reviewed the chart and agree that the record accurately reflects my personal performance of the history, physical exam, assessment and plan. I have also personally directed, reviewed, and agreed with the chart.

## 2025-04-07 NOTE — PHYSICAL EXAM
[Normal] : Gait: normal [Rad] : radial 2+ and symmetric bilaterally [de-identified] : Constitutional o Appearance : well-nourished, well developed, alert, in no acute distress  Head and Face o Head :  Inspection : atraumatic, normocephalic o Face :  Inspection : no visible rash or discoloration Respiratory o Respiratory Effort: breathing unlabored  Neurologic o Sensation : Normal sensation  Psychiatric o Mood and Affect: mood normal, affect appropriate  Lymphatic o Additional Nodes : No palpable lymph nodes present   Lum bosacral Spine o Inspection : no visible rash or discoloration o Palpation : no paraspinal musculature tenderness o Range of Motion : extension and rotation cause pain the thigh,  o Muscle Strength : paraspinal muscle strength and tone within normal limits o Muscle Tone : paraspinal muscle strength and tone within normal limits o Tests: straight leg test negative bilaterally, GALO test negative bilaterally  Right Lower Extremity o Buttock : no tenderness, swelling or deformities  o Right Hip :    - Inspection/Palpation : mild greater trochanteric and ITB tenderness, no swelling or deformities    - Range of Motion : full flexion, and pain with IR rotation that is limited no crepitance    - Stability : joint stability intact    - Strength : extension, flexion 4-/5, adduction, abduction, internal rotation and external rotation    - Tests: Felipe's test negative  o Right Knee :  Inspection/Palpation :  posteromedial joint line tenderness, no swelling  Range of Motion : 0-118, slightly decreased patellofemoral glide, mild patellofemoral crepitance  Stability : no valgus or varus instability present on provocative testing  Strength : flexion and extension 4-/5  Tests and Signs : negative Anterior Drawer, negative Lachman, negative Evelyne  Left Lower Extremity o Buttock : no tenderness, swelling or deformities  o Left Hip :    - Inspection/Palpation : mild greater trochanteric and ITB tenderness, no swelling or deformities    - Range of Motion : full flex and limtiedrotation without pain, no crepitance    - Stability : joint stability intact    - Strength : extension, flexion 5/5, adduction, abduction, internal rotation and external rotation 5/5    - Tests: Felipe's test negative  o Left Knee :  Inspection/Palpation : mild medial compartment tenderness no lateral compartment tenderness , no swelling  Range of Motion : 0-115 , minimal patellofemoral crepitance, decreased patellofemoral glide   Stability : no valgus or varus instability present on provocative testing  Strength : flexion and extension 5/5  Tests and Signs : negative Anterior Drawer, negative Lachman, negative Evelyne  Gait and Station: Gait: ambulating with a cane, slightly kyphotic, no significant extremity swelling or lymphedema, poor core strength, very tight hamstrings bilaterally, no foot drop, normal sensation to light touch, abductor lurch to the right and slightly broad base gait, 1/2 inch short on the right  o Knee injection : Indication- left knee osteoarthritis, Anatomic location- left intra-articular joint space, Spray - area was sterilized with Betadine and alcohol and anesthetized with Ethyl Chloride , needle used-20G, Medications given- 88mg/4mL Monovisc under Ultrasound guidance. The patient tolerated the procedure well.  [de-identified] : AP, transscapular and axillary x-rays of the left shoulder demonstrate no fracture, no dislocation and no bony abnormality.

## 2025-04-09 NOTE — HISTORY OF PRESENT ILLNESS
[FreeTextEntry1] : 80 Y old female here for f/up of poorly differentiated thyroid carcinoma of left thyroid lobe s/p left hemithyroidectomy in 4/2022 followed by completion thyroidectomy 8/2022  thyroid papillary microcarcinoma  Timeline:  CT scan 12/2021: Enlargement of left thyroid lobe with associated substernal extension and rightward tracheal displacement/mild tracheal compression.   Left hemithyroidectomy done 4/2022:  -poorly differentiated thyroid carcinoma, 6.8cm in greatest dimension, widely invasive -extensive lymphovascular invasion present -no perineural invasion identified -the resection margins are negative for carcinoma  Had completion thyroidectomy 8/2022:   Pathology 8/8/2022: 1: Right thyroid lobe substernal goiter: papillary microcarcinoma (1mm); chromic lymphocytic thyroiditis.   9/2022:  anti-TG abs +ve 43.5  TG 0.69   10/3/2022:  Thyrogen stim NM uptake scan:   " iodine-avid tissue in the anterior neck which localize in the left thyroid bed in SPECT/ CT. Unremarkable tracer activity is seen in the nasopharynx, salivary glands, GI tract, urinary bladder. FDG avid focus in the tight thyroid bed on PET/ CT is not iodine avid.   10/5/2022: PET/CT FDG: Increase uptake at soft tissue nodule in the RIGHT paratracheal region, suspicious for malignant involvement.  Anterior neck midline activity corresponds to an incision and may represent healing...., small symmetric moderately avid nodes seen...... osseous involvement, with examples at the iliac bones, and right femoral neck.."   10/7/2022:  Thyrogen stim I-131 scan demonstrates iodine avid tissue in the anterior neck/ thyroid bed. 48hr neck uptake is 0.22%. Undetectable stimulated thyroglobulin in presence of thyroglobulin antibody.   10/12/2022:  NM thyroid ca radiopharm oral treatment:   uneventful administration of 190.8mci of I131 for thyroid remnant ablation after total thyroidectomy for thyroid cancer.    10/18/2022:   post therapy I 131 WBS:  iodine avid tissue in the anterior neck/ thyroid bed, just to the left of the midline, unchanged in distribution compared to diagnostic study on 10/7/0222.  No new lesions. No radiotracer activity to correspond to the FDG avid focus in the right paratracheal region, pelvis and right femoral neck seen on PET/ CT from 10/5/2022.    12/27/2022: Due to see head and neck surgery :reports that her appointment was postponed till may 2023; she was concerned about long wait, so scheduled f/up with head and neck at [facility] in coming this month 2/2023  TG: undetectable (LCMS) TG-ab <20  5/2023: thyroid sonogram: thyroidectomy bed unremarkable, 0.8 and 0.4 cm left cervical level 3 LNs, indeterminate   8/2023: thyroid sonogram : 2.9 x 0.5 cm left cervical level 3 lymph node, unchanged  Saw ENT 12/2023: Dr Harper, no reported intervention for LN, plans for just sonographic monitoring. Documentation requested from [facility].   Interval: followed with Dr Harper, reports stable sonograms, no intervention planned  12-9-2024: TG <0.10 12-: neck sonogram: stable lateral neck lymph nodes, larger measuring 0.6cm on the left side and stable 0.4cm hypoechoic nodule in the left inferior central neck   12-: patient reported that she had f/up sonogram and evaluation with ENT Dr Harper and no intervention planned, just monitoring.   3-: TG <0.10 ng/ml  T2DM:  A1c 6.9%  Patient restarted Ozempic currently on 0.5mg Qweekly  Reports weight loss

## 2025-04-09 NOTE — PHYSICAL EXAM
[Alert] : alert [Well Nourished] : well nourished [No Acute Distress] : no acute distress [Well Developed] : well developed [Normal Sclera/Conjunctiva] : normal sclera/conjunctiva [EOMI] : extra ocular movement intact [No Proptosis] : no proptosis [Normal Oropharynx] : the oropharynx was normal [No LAD] : no lymphadenopathy [Supple] : the neck was supple [Well Healed Scar] : well healed scar [No Respiratory Distress] : no respiratory distress [No Accessory Muscle Use] : no accessory muscle use [Clear to Auscultation] : lungs were clear to auscultation bilaterally [Normal S1, S2] : normal S1 and S2 [Normal Rate] : heart rate was normal [Regular Rhythm] : with a regular rhythm [No Edema] : no peripheral edema [Pedal Pulses Normal] : the pedal pulses are present [Normal Bowel Sounds] : normal bowel sounds [Not Tender] : non-tender [Not Distended] : not distended [Soft] : abdomen soft [Normal Anterior Cervical Nodes] : no anterior cervical lymphadenopathy [No Spinal Tenderness] : no spinal tenderness [Spine Straight] : spine straight [No Stigmata of Cushings Syndrome] : no stigmata of Cushings Syndrome [Normal Gait] : normal gait [Normal Strength/Tone] : muscle strength and tone were normal [No Rash] : no rash [Normal Reflexes] : deep tendon reflexes were 2+ and symmetric [No Tremors] : no tremors [Oriented x3] : oriented to person, place, and time [Acanthosis Nigricans] : no acanthosis nigricans

## 2025-05-13 NOTE — HEALTH RISK ASSESSMENT
[0] : 2) Feeling down, depressed, or hopeless: Not at all (0) [PHQ-2 Negative - No further assessment needed] : PHQ-2 Negative - No further assessment needed [Never] : Never [[id number]] : 0

## 2025-05-13 NOTE — PHYSICAL EXAM
[No Acute Distress] : no acute distress [Well Nourished] : well nourished [Well Developed] : well developed [Well-Appearing] : well-appearing [Normal Sclera/Conjunctiva] : normal sclera/conjunctiva [PERRL] : pupils equal round and reactive to light [EOMI] : extraocular movements intact [Normal Outer Ear/Nose] : the outer ears and nose were normal in appearance [Normal Oropharynx] : the oropharynx was normal [No JVD] : no jugular venous distention [No Lymphadenopathy] : no lymphadenopathy [Supple] : supple [Thyroid Normal, No Nodules] : the thyroid was normal and there were no nodules present [No Respiratory Distress] : no respiratory distress  [No Accessory Muscle Use] : no accessory muscle use [Clear to Auscultation] : lungs were clear to auscultation bilaterally [Normal Rate] : normal rate  [Regular Rhythm] : with a regular rhythm [Normal S1, S2] : normal S1 and S2 [No Murmur] : no murmur heard [No Carotid Bruits] : no carotid bruits [No Varicosities] : no varicosities [Pedal Pulses Present] : the pedal pulses are present [No Edema] : there was no peripheral edema [No Extremity Clubbing/Cyanosis] : no extremity clubbing/cyanosis [Soft] : abdomen soft [Non Tender] : non-tender [Non-distended] : non-distended [Normal Bowel Sounds] : normal bowel sounds [Normal Posterior Cervical Nodes] : no posterior cervical lymphadenopathy [Normal Anterior Cervical Nodes] : no anterior cervical lymphadenopathy [No CVA Tenderness] : no CVA  tenderness [No Spinal Tenderness] : no spinal tenderness [No Joint Swelling] : no joint swelling [Grossly Normal Strength/Tone] : grossly normal strength/tone [No Rash] : no rash [Coordination Grossly Intact] : coordination grossly intact [No Focal Deficits] : no focal deficits [Normal Gait] : normal gait [Normal Affect] : the affect was normal [Alert and Oriented x3] : oriented to person, place, and time [Normal Insight/Judgement] : insight and judgment were intact [de-identified] : lateral to R breast near rib area: with small circular area of erythema unclear bug bite no cellulitis, no hot, warm or tender

## 2025-05-13 NOTE — HISTORY OF PRESENT ILLNESS
[de-identified] : KEV BOYER is an 80-year old female with PMH kidney stones s/p hx of stent now removed, spinal stenosis, thyroid cancer s/p total thyroidectomy s/p radioactive iodine, presented to the office today for 5-month f/u. Pt reports this weekend she has lower back pain after moving heavy furniture says she takes Advil which helps a little but still is bad.  She has concert tickets for tomorrow night at [employer] and is requesting for a written note that she cannot go given pain and need to be mobile. Denies LE weakness, urinary/bowel incontinence, saddle anesthesia. She says she has a rash on her R breast, noticed it for a couple days now and says it can be itchy sometimes. Denies any tenderness or pain around the nipple.  Denies chest pain, SOB, AKERS, dizziness, diaphoresis, palpitations, LE swelling, orthopnea, syncope, n/v, headache.

## 2025-05-27 NOTE — HISTORY OF PRESENT ILLNESS
[de-identified] : 80 year old female presents for a bilateral knee follow-up. She had a right knee Monovics injection on 4/8/2025. She had a left knee Monovisc injection on 4/7/2025. She is thrilled with her progress and results. She denies any swelling or buckling. Patient presents ambulating with a cane in the right hand. Patient denies that she has any numbness or tingling. Patient denies taking any medication to manage her pain. Of note, She saw Dr. Casillas regarding her spinal stenosis and will consider surgery when she is miserable. She notes her vertigo has been under control.  Radiology Results 2/11/2025 o Lumbosacral Spine : AP and lateral views were obtained and revealed grade 1 spondylolisthesis of L4-5 and no evident compression fracture and foraminal stenosis at L4-5 and L5-S1  o Pelvis : AP pelvis was obtained and revealed moderate arthritis of both hips    Radiology Results 11/18/2024 o Right Knee : Standing AP, lateral and tunnel views of the knee were obtained and revealed severe medial and patellofemoral arthritis  o Left Knee : Standing AP, lateral and tunnel views of the knee were obtained and revealed moderate to severe medial and sig patellofemoral arthritis  Radiology Results done on 5/10/2024 o Lumbosacral Spine : AP and lateral views were obtained and revealed grade 1 spondylolisthesis of L4-5 and L5-S1 with diffuse facet arthropathy and foraminal stenosis at L4-5 and L5-S1 o Pelvis : AP pelvis was obtained and revealed moderate arthritis of both hips   Radiology Results 09/28/2023 o Right Knee : Standing AP, lateral and tunnel views of the knee were obtained and revealed severe medial patellofemoral arthritis  o Left Knee : Standing AP, lateral and tunnel views of the knee were obtained and revealed severe medial patellofemoral arthritis   Radiology Results from 03/16/2023: o Right Knee : Standing AP, lateral, tunnel, and skyline views of the knee were obtained and revealed severe medial compartment narrowing with moderate patellofemoral arthritis.  o Left Knee : Standing AP, lateral, tunnel, and skyline views of the knee were obtained and revealed moderate medial and patellofemoral arthritis.

## 2025-05-27 NOTE — ADDENDUM
[FreeTextEntry1] : I, ANGELLA KONG, acted solely as a scribe for Dr. Luke Helton on this date 05/27/2025.  All medical record entries made by the Scribe were at my, Dr. Luke Helton, direction and personally dictated by me on 05/27/2025. I have reviewed the chart and agree that the record accurately reflects my personal performance of the history, physical exam, assessment and plan. I have also personally directed, reviewed, and agreed with the chart.

## 2025-05-27 NOTE — DISCUSSION/SUMMARY
[de-identified] : I went over the pathophysiology of the patient's symptoms in great detail with the patient. She needs to avoid high-impact activities such as running and jumping or riding a treadmill. I recommend alternative activities such as riding a stationary bike or elliptical on low tension. She should focus on light weight and high repetition exercises. She should avoid squatting and kneeling. Proper cane walking protocol was discussed and demonstrated with the patient. I stressed the importance of weight loss and its benefits to the patients joints and overall health. I informed the patient they are due for a repeat gel injection any time after 10/7/2025. We are prescribing Celebrex at this time.   All of their questions were answered. They understand and consent to the plan.   FU after 10/7/2025

## 2025-05-27 NOTE — PHYSICAL EXAM
[Normal] : Gait: normal [Rad] : radial 2+ and symmetric bilaterally [de-identified] : Constitutional o Appearance : well-nourished, well developed, alert, in no acute distress  Head and Face o Head :  Inspection : atraumatic, normocephalic o Face :  Inspection : no visible rash or discoloration Respiratory o Respiratory Effort: breathing unlabored  Neurologic o Sensation : Normal sensation  Psychiatric o Mood and Affect: mood normal, affect appropriate  Lymphatic o Additional Nodes : No palpable lymph nodes present   Lum bosacral Spine o Inspection : no visible rash or discoloration o Palpation : no paraspinal musculature tenderness o Range of Motion : extension and rotation cause pain the thigh,  o Muscle Strength : paraspinal muscle strength and tone within normal limits o Muscle Tone : paraspinal muscle strength and tone within normal limits o Tests: straight leg test negative bilaterally, GALO test negative bilaterally  Right Lower Extremity o Buttock : no tenderness, swelling or deformities  o Right Hip :    - Inspection/Palpation : mild greater trochanteric and ITB tenderness, no swelling or deformities    - Range of Motion : full flexion, and pain with IR rotation that is limited no crepitance    - Stability : joint stability intact    - Strength : extension, flexion 4-/5, adduction, abduction, internal rotation and external rotation    - Tests: Felipe's test negative  o Right Knee :  Inspection/Palpation :  no medial compartment tenderness or lateral compartment tenderness no swelling  Range of Motion : 0-120, slightly decreased patellofemoral glide, good patellofemoral crepitance  Stability : no valgus or varus instability present on provocative testing  Strength : flexion and extension 4-/5  Tests and Signs : negative Anterior Drawer, negative Lachman, negative Evelyne  Left Lower Extremity o Buttock : no tenderness, swelling or deformities  o Left Hip :    - Inspection/Palpation : mild greater trochanteric and ITB tenderness, no swelling or deformities    - Range of Motion : full flex and limtiedrotation without pain, no crepitance    - Stability : joint stability intact    - Strength : extension, flexion 5/5, adduction, abduction, internal rotation and external rotation 5/5    - Tests: Felipe's test negative  o Left Knee :  Inspection/Palpation :  no medial and lateral compartment tenderness no lateral compartment tenderness , no swelling, small bakers cyst  Range of Motion : 0-120 minimal patellofemoral crepitance, decreased patellofemoral glide   Stability : no valgus or varus instability present on provocative testing  Strength : flexion and extension 4+/5  Tests and Signs : negative Anterior Drawer, negative Lachman, negative Evelyne  Gait and Station: Gait: ambulating with a cane, slightly kyphotic, no significant extremity swelling or lymphedema, poor core strength, very tight hamstrings bilaterally, no foot drop, normal sensation to light touch, abductor lurch to the right and slightly broad base gait, 1/2 inch short on the right [de-identified] : AP, transscapular and axillary x-rays of the left shoulder demonstrate no fracture, no dislocation and no bony abnormality.

## 2025-06-23 NOTE — HISTORY OF PRESENT ILLNESS
[FreeTextEntry1] : Patient was seen in the past for kidney stone. Return today for abnormal UA, RBC 61, WBC 75, Protein 100, Creatinine 0.71 GFR 85 in May 2025, patient denied any urological symptom.   Repeat urinalysis.  CT scan.  Follow-up 6 months.  Please refer to URO Consult note

## 2025-06-23 NOTE — LETTER BODY
[FreeTextEntry1] : Guy Bean MD [address]  [place] [address] [phone]  Dear Dr Bean,   Reason for visit: Abnormal urinalysis. Possible microscopic hematuria. Previous kidney stone.     This is an 81 year-old woman who was seen previously for kidney stones found to have abnormal urinalysis. Her test report demonstrated evidence of hematuria on urinary dipstick. Urinalysis demonstrated 61 RBC/HPF, 75 WBC.HPF, urine protein 100. The patient denies any urological symptoms. The patient is entirely asymptomatic.All other review of systems are negative. She has no cancer in her family medical history. She has no previous surgical history. Past medical history, family history and social history were inquired and were noncontributory to current condition. The patient does not use tobacco or drink alcohol. Medications and allergies were reviewed. She has no known allergies to medication.   On examination, the patient is a healthy-appearing woman in no acute distress. She is alert and oriented follows commands. She has normal mood and affect. She is normocephalic. Neck is supple. Respirations are unlabored. Abdomen is soft and nontender. Liver is not palpable.Bladder is nonpalpable. No CVA tenderness. Neurologically she is grossly intact. No peripheral edema. Skin without gross abnormality.  Her BMP demonstrated normal renal functions, creatinine 0.71, GFR 85. Her urinalysis showed 61 RBC/HPF, 75 WBC/HPF, urine protein of 100. Her urine culture was negative.   Assessment: Abnormal urinalysis, possible microscopic hematuria.   I counseled the patient on the various etiologies of the microscopic hematuria. I discussed the risk of occult malignancy.  I recommended the patient obtain urinalysis, urine culture, and urine cytology to evaluate for infections and high grade urothelial carcinoma. I also recommended that the patient obtain a CT scan for further evaluation.  If she has persistent microscopic hematuria, she will consider cystoscopy.  I counseled the patient regarding the procedure. The risks and benefits were discussed. Alternatives were given. I answered the patient's questions. The patient will take the necessary preparations for the procedure. The patient will follow up as directed and contact me with any questions or concerns.    Plan: Urinalysis. Urine culture. Urine cytology. CT scan. Follow up in 6 months.  Consider cystoscopy.

## 2025-06-23 NOTE — ADDENDUM
[FreeTextEntry1] : Entered by Cierra Esquivel, acting as scribe for Dr Angus Young. The documentation recorded by the scribe accurately reflects the service I personally performed and the decisions made by me.